# Patient Record
Sex: FEMALE | Race: BLACK OR AFRICAN AMERICAN | NOT HISPANIC OR LATINO | URBAN - METROPOLITAN AREA
[De-identification: names, ages, dates, MRNs, and addresses within clinical notes are randomized per-mention and may not be internally consistent; named-entity substitution may affect disease eponyms.]

---

## 2024-07-22 ENCOUNTER — ON-DEMAND VIRTUAL (OUTPATIENT)
Dept: URGENT CARE | Facility: CLINIC | Age: 44
End: 2024-07-22

## 2024-07-22 DIAGNOSIS — L29.9 PRURITUS: ICD-10-CM

## 2024-07-22 DIAGNOSIS — J01.00 ACUTE NON-RECURRENT MAXILLARY SINUSITIS: Primary | ICD-10-CM

## 2024-07-22 PROCEDURE — 99213 OFFICE O/P EST LOW 20 MIN: CPT | Mod: 95,,, | Performed by: FAMILY MEDICINE

## 2024-07-22 RX ORDER — AZITHROMYCIN 250 MG/1
TABLET, FILM COATED ORAL
Qty: 6 TABLET | Refills: 0 | Status: SHIPPED | OUTPATIENT
Start: 2024-07-22 | End: 2024-07-27

## 2024-07-22 RX ORDER — HYDROXYZINE HYDROCHLORIDE 50 MG/1
50 TABLET, FILM COATED ORAL 3 TIMES DAILY PRN
Qty: 21 TABLET | Refills: 0 | Status: SHIPPED | OUTPATIENT
Start: 2024-07-22 | End: 2024-07-29

## 2024-07-22 NOTE — PROGRESS NOTES
Subjective:      Patient ID: Jono Rhodes is a 43 y.o. female.    Vitals:  vitals were not taken for this visit.     Chief Complaint: Sinusitis      Visit Type: TELE AUDIOVISUAL    Present with the patient at the time of consultation: TELEMED PRESENT WITH PATIENT: None    History reviewed. No pertinent past medical history.  History reviewed. No pertinent surgical history.  Review of patient's allergies indicates:  Not on File  No current outpatient medications on file prior to visit.     No current facility-administered medications on file prior to visit.     Family History   Problem Relation Name Age of Onset    Hyperlipidemia Mother      Hypertension Mother      Skin cancer Mother      Diabetes Mother      Heart disease Father      Hypertension Father      Diabetes Father      Bone cancer Father      Heart disease Sister      Hypertension Sister      Diabetes Sister         Medications Ordered                [id number] - [place], [place]  [address] [facility] [address] OF [address] [address]    Telephone: [phone]   Fax: [phone]   Hours: Not open 24 hours                         E-Prescribed (2 of 2)              azithromycin (Z-SERAFIN) 250 MG tablet    Sig: Take 2 tablets by mouth on day 1; Take 1 tablet by mouth on days 2-5       Start: 7/22/24     Quantity: 6 tablet Refills: 0                         hydrOXYzine (ATARAX) 50 MG tablet    Sig: Take 1 tablet (50 mg total) by mouth 3 (three) times daily as needed for Itching.       Start: 7/22/24     Quantity: 21 tablet Refills: 0                           Ohs Peq Odvv Intake    7/22/2024  4:50 PM CDT - Filed by Patient   What is your current physical address in the event of a medical emergency? [facility] [place]   Are you able to take your vital signs? No   Please attach any relevant images or files          Patient Location:  home    Sinusitis  This is a new problem. The current episode started 1 to 4 weeks ago. The problem  has been gradually worsening since onset. There has been no fever. Associated symptoms include congestion, coughing, a hoarse voice, sinus pressure and sneezing. Pertinent negatives include no chills, diaphoresis, ear pain, headaches, neck pain, shortness of breath, sore throat or swollen glands. (Itching of the face and hands) Treatments tried: claritin, benadryl, tylenol sinus.       Constitution: Negative for chills and sweating.   HENT:  Positive for congestion and sinus pressure. Negative for ear pain and sore throat.    Neck: Negative for neck pain.   Respiratory:  Positive for cough. Negative for shortness of breath.    Allergic/Immunologic: Positive for sneezing.   Neurological:  Negative for headaches.        Objective:   The physical exam was conducted virtually.  Physical Exam   Constitutional: She is oriented to person, place, and time.   HENT:   Head: Normocephalic.   Ears:   Right Ear: External ear normal.   Left Ear: External ear normal.   Nose: Congestion present.   Eyes: Conjunctivae are normal.   Pulmonary/Chest: Effort normal. No respiratory distress.   Abdominal: Normal appearance.   Neurological: She is alert and oriented to person, place, and time.   Skin: Skin is rash.         Comments: Hives on chest   Psychiatric: Her behavior is normal.       Assessment:     1. Acute non-recurrent maxillary sinusitis    2. Pruritus        Plan:       Acute non-recurrent maxillary sinusitis    Pruritus    Other orders  -     azithromycin (Z-SERAFIN) 250 MG tablet; Take 2 tablets by mouth on day 1; Take 1 tablet by mouth on days 2-5  Dispense: 6 tablet; Refill: 0  -     hydrOXYzine (ATARAX) 50 MG tablet; Take 1 tablet (50 mg total) by mouth 3 (three) times daily as needed for Itching.  Dispense: 21 tablet; Refill: 0    I counseled the patient on general home care guidelines for cough and congestion including increasing fluid intake, getting plenty of rest and use of OTC cough and cold medications.  I recommended  guafenesin for congestion and dextromethorphan as directed for cough.  A brand like Mucinex DM is recommended.  Avoidance of decongestants is recommended for patients with heart problems and hypertension.  Extra vitamin C may also benefit.  Return to clinic if symptoms last longer than 10 days or sooner if worsen with symptoms like fever > 100.4, severe sinus pain or headache, thick yellow nasal discharge or sputum, dehydration or lethargy.